# Patient Record
Sex: FEMALE | Race: WHITE
[De-identification: names, ages, dates, MRNs, and addresses within clinical notes are randomized per-mention and may not be internally consistent; named-entity substitution may affect disease eponyms.]

---

## 2019-04-07 ENCOUNTER — HOSPITAL ENCOUNTER (EMERGENCY)
Dept: HOSPITAL 96 - M.ERS | Age: 59
Discharge: ANOTHER HOSPITAL | End: 2019-04-07
Payer: COMMERCIAL

## 2019-04-07 VITALS — BODY MASS INDEX: 26.98 KG/M2 | WEIGHT: 158.01 LBS | HEIGHT: 64 IN

## 2019-04-07 VITALS — SYSTOLIC BLOOD PRESSURE: 88 MMHG | DIASTOLIC BLOOD PRESSURE: 51 MMHG

## 2019-04-07 VITALS — SYSTOLIC BLOOD PRESSURE: 101 MMHG | DIASTOLIC BLOOD PRESSURE: 68 MMHG

## 2019-04-07 DIAGNOSIS — J44.1: Primary | ICD-10-CM

## 2019-04-07 DIAGNOSIS — Z88.6: ICD-10-CM

## 2019-04-07 DIAGNOSIS — F32.9: ICD-10-CM

## 2019-04-07 DIAGNOSIS — Z88.8: ICD-10-CM

## 2019-04-07 DIAGNOSIS — F17.210: ICD-10-CM

## 2019-04-07 DIAGNOSIS — F41.9: ICD-10-CM

## 2019-04-07 LAB
ABSOLUTE MONOCYTES: 0.7 THOU/UL (ref 0–1.2)
ALBUMIN SERPL-MCNC: 2.9 G/DL (ref 3.4–5)
ALP SERPL-CCNC: 107 U/L (ref 46–116)
ALT SERPL-CCNC: 91 U/L (ref 30–65)
ANION GAP SERPL CALC-SCNC: 8 MMOL/L (ref 7–16)
AST SERPL-CCNC: 53 U/L (ref 15–37)
BE: -0.5 MMOL/L
BILIRUB SERPL-MCNC: 1.1 MG/DL
BUN SERPL-MCNC: 33 MG/DL (ref 7–18)
CALCIUM SERPL-MCNC: 8.5 MG/DL (ref 8.5–10.1)
CHLORIDE SERPL-SCNC: 96 MMOL/L (ref 98–107)
CO2 SERPL-SCNC: 29 MMOL/L (ref 21–32)
CREAT SERPL-MCNC: 1.6 MG/DL (ref 0.6–1.3)
GLUCOSE SERPL-MCNC: 122 MG/DL (ref 70–99)
GRANULOCYTES NFR BLD MANUAL: 78 %
HCT VFR BLD CALC: 34.6 % (ref 37–47)
HGB BLD-MCNC: 11.8 GM/DL (ref 12–15)
INR PPP: 1.1
LYMPHOCYTES # BLD: 1.6 THOU/UL (ref 0.8–5.3)
LYMPHOCYTES NFR BLD AUTO: 12 %
MCH RBC QN AUTO: 31 PG (ref 26–34)
MCHC RBC AUTO-ENTMCNC: 34.2 G/DL (ref 28–37)
MCV RBC: 90.7 FL (ref 80–100)
MONOCYTES NFR BLD: 5 %
MPV: 9.7 FL. (ref 7.2–11.1)
NEUTROPHILS # BLD: 11.3 THOU/UL (ref 1.6–8.1)
NEUTS BAND NFR BLD: 5 %
NT-PRO BRAIN NAT PEPTIDE: 578 PG/ML (ref ?–300)
NUCLEATED RBCS: 0 /100WBC
PCO2 BLD: 47.6 MMHG (ref 35–45)
PLATELET # BLD EST: ADEQUATE 10*3/UL
PLATELET COUNT*: 155 THOU/UL (ref 150–400)
PO2 BLD: 151.2 MMHG (ref 75–100)
POTASSIUM SERPL-SCNC: 3.9 MMOL/L (ref 3.5–5.1)
PROT SERPL-MCNC: 7.2 G/DL (ref 6.4–8.2)
PROTHROMBIN TIME: 11.1 SECONDS (ref 9.2–11.5)
RBC # BLD AUTO: 3.81 MIL/UL (ref 4.2–5)
RBC MORPH BLD: NORMAL
RDW-CV: 11.6 % (ref 10.5–14.5)
SODIUM SERPL-SCNC: 133 MMOL/L (ref 136–145)
TROPONIN-I LEVEL: <0.06 NG/ML (ref ?–0.06)
WBC # BLD AUTO: 13.6 THOU/UL (ref 4–11)

## 2019-04-22 ENCOUNTER — HOSPITAL ENCOUNTER (INPATIENT)
Dept: HOSPITAL 96 - M.ERS | Age: 59
LOS: 3 days | Discharge: HOME HEALTH | DRG: 189 | End: 2019-04-25
Attending: HOSPITALIST | Admitting: HOSPITALIST
Payer: COMMERCIAL

## 2019-04-22 VITALS — SYSTOLIC BLOOD PRESSURE: 109 MMHG | DIASTOLIC BLOOD PRESSURE: 66 MMHG

## 2019-04-22 VITALS — DIASTOLIC BLOOD PRESSURE: 52 MMHG | SYSTOLIC BLOOD PRESSURE: 96 MMHG

## 2019-04-22 VITALS — SYSTOLIC BLOOD PRESSURE: 106 MMHG | DIASTOLIC BLOOD PRESSURE: 61 MMHG

## 2019-04-22 VITALS — SYSTOLIC BLOOD PRESSURE: 104 MMHG | DIASTOLIC BLOOD PRESSURE: 63 MMHG

## 2019-04-22 VITALS — BODY MASS INDEX: 25.43 KG/M2 | HEIGHT: 60.98 IN | WEIGHT: 134.7 LBS

## 2019-04-22 DIAGNOSIS — I25.10: ICD-10-CM

## 2019-04-22 DIAGNOSIS — I25.2: ICD-10-CM

## 2019-04-22 DIAGNOSIS — Z79.899: ICD-10-CM

## 2019-04-22 DIAGNOSIS — Z90.49: ICD-10-CM

## 2019-04-22 DIAGNOSIS — Z88.6: ICD-10-CM

## 2019-04-22 DIAGNOSIS — N18.3: ICD-10-CM

## 2019-04-22 DIAGNOSIS — M19.90: ICD-10-CM

## 2019-04-22 DIAGNOSIS — J96.22: Primary | ICD-10-CM

## 2019-04-22 DIAGNOSIS — R65.10: ICD-10-CM

## 2019-04-22 DIAGNOSIS — G93.41: ICD-10-CM

## 2019-04-22 DIAGNOSIS — F41.9: ICD-10-CM

## 2019-04-22 DIAGNOSIS — F17.210: ICD-10-CM

## 2019-04-22 DIAGNOSIS — J44.1: ICD-10-CM

## 2019-04-22 DIAGNOSIS — Z88.1: ICD-10-CM

## 2019-04-22 DIAGNOSIS — Z79.82: ICD-10-CM

## 2019-04-22 DIAGNOSIS — Z88.8: ICD-10-CM

## 2019-04-22 DIAGNOSIS — J45.909: ICD-10-CM

## 2019-04-22 DIAGNOSIS — J96.21: ICD-10-CM

## 2019-04-22 LAB
ABSOLUTE BASOPHILS: 0.1 THOU/UL (ref 0–0.2)
ABSOLUTE EOSINOPHILS: 0.1 THOU/UL (ref 0–0.7)
ABSOLUTE MONOCYTES: 0.8 THOU/UL (ref 0–1.2)
ALBUMIN SERPL-MCNC: 3.1 G/DL (ref 3.4–5)
ALP SERPL-CCNC: 115 U/L (ref 46–116)
ALT SERPL-CCNC: 33 U/L (ref 30–65)
ANION GAP SERPL CALC-SCNC: 5 MMOL/L (ref 7–16)
AST SERPL-CCNC: 18 U/L (ref 15–37)
BASOPHILS NFR BLD AUTO: 0.5 %
BE: 6.3 MMOL/L
BILIRUB SERPL-MCNC: 0.3 MG/DL
BUN SERPL-MCNC: 20 MG/DL (ref 7–18)
CALCIUM SERPL-MCNC: 8.9 MG/DL (ref 8.5–10.1)
CHLORIDE SERPL-SCNC: 100 MMOL/L (ref 98–107)
CO2 SERPL-SCNC: 37 MMOL/L (ref 21–32)
CREAT SERPL-MCNC: 0.9 MG/DL (ref 0.6–1.3)
EOSINOPHIL NFR BLD: 0.8 %
GLUCOSE SERPL-MCNC: 118 MG/DL (ref 70–99)
GRANULOCYTES NFR BLD MANUAL: 74.7 %
HCT VFR BLD CALC: 38 % (ref 37–47)
HGB BLD-MCNC: 12.4 GM/DL (ref 12–15)
LYMPHOCYTES # BLD: 2 THOU/UL (ref 0.8–5.3)
LYMPHOCYTES NFR BLD AUTO: 17.2 %
MCH RBC QN AUTO: 30.3 PG (ref 26–34)
MCHC RBC AUTO-ENTMCNC: 32.7 G/DL (ref 28–37)
MCV RBC: 92.8 FL (ref 80–100)
MONOCYTES NFR BLD: 6.8 %
MPV: 8.2 FL. (ref 7.2–11.1)
NEUTROPHILS # BLD: 8.6 THOU/UL (ref 1.6–8.1)
NUCLEATED RBCS: 0 /100WBC
PCO2 BLD: 61.7 MMHG (ref 35–45)
PLATELET COUNT*: 288 THOU/UL (ref 150–400)
PO2 BLD: 72.6 MMHG (ref 75–100)
POTASSIUM SERPL-SCNC: 3.5 MMOL/L (ref 3.5–5.1)
PROT SERPL-MCNC: 7.3 G/DL (ref 6.4–8.2)
RBC # BLD AUTO: 4.1 MIL/UL (ref 4.2–5)
RDW-CV: 12.2 % (ref 10.5–14.5)
SODIUM SERPL-SCNC: 142 MMOL/L (ref 136–145)
TROPONIN-I LEVEL: <0.06 NG/ML (ref ?–0.06)
WBC # BLD AUTO: 11.5 THOU/UL (ref 4–11)

## 2019-04-22 PROCEDURE — 5A09357 ASSISTANCE WITH RESPIRATORY VENTILATION, LESS THAN 24 CONSECUTIVE HOURS, CONTINUOUS POSITIVE AIRWAY PRESSURE: ICD-10-PCS | Performed by: HOSPITALIST

## 2019-04-22 NOTE — NUR
PT O2 SAT FLUCTUATING, NURSE WENT INTO ROOM. BIPAP MASK OFF PT, NO ONE AT
BEDSIDE. NURSE ASKED PT IF PT TOOK BIPAP OFF, PT NODDED HEAD UP AND DOWN. NURSE
INFORMED PT THAT BIPAP WAS HELPING PT AND PT NEEDED TO KEEP BIPAP ON. PT STATED
UNDERSTANDING AND STATED "BUT I HAD TO SPIT". PT ATTEMPTED TO REATTACH BIPAP
MASK AND DROPPED HEAD AND CLOSED EYES IN THE MIDDLE OF ATTEMPTING. NURSE STATED
PT NAME, PT HEAD CAME BACK UP AND PT SAID "OH YES, WHAT?". NURSE REATTACHED
BIPAP. ALIDA, NP MADE AWARE AND IN ROOM TALKING TO PT.

## 2019-04-22 NOTE — NUR
PT BROUGHT IN BY EMS WITH REPORTS OF SOA. PT TRANSFERRED FROM AMBULANCE COT TO
ER BED BY SELF. STAFF AT BEDSIDE READY TO ASSIST IF NEED BE. PT CHANGED INTO
GOWN. TELEMETRY, BP CUFF, AND PULSE OX APPLIED TO PT. CALL LIGHT WITHIN REACH.

## 2019-04-22 NOTE — NUR
VSS, ASSUMED CARE OF PT FROM ER, PT IS IN BED WITH CALL LIGHT IN REACH AND
FAMILY AT BEDSIDE, PT IS A&O4 ON 3L NC AND IS UP WITH STAND BY, WILL FOLLOW
WITH PLAN OF CARE.

## 2019-04-22 NOTE — NUR
INITL ASSESSMENT COMPLETED AT 2000. PT RANG LIGHT AT THAT TIME STATING SHE
COULD NOT BREATHE AND WAS IN A PANIC. PT ON O2 AT 3 LITERS, RESPIRATIONS
28-30 HEART RATE IN 80'S. RT GAVE PT DUONEB TREATMENT. PT GIVEN PRN LORAZEPAM.
PT PLACED ON HOME TRILOGY UNIT PER RT. PT'S O2 SAT WAS 94% AFTER
INTERVENTIONS. DR RADER NOTIFIED AT 2200 REGARDING EVENTS. ORDERS RECIEVED.

## 2019-04-22 NOTE — EKG
[place] [address]
Phone:  [phone]                     ELECTROCARDIOGRAM REPORT      
_______________________________________________________________________________
 
Name:       TAMMY BAGLEY                Room:           [address]    ADM [place]  
.R.#:  [id number]      Account #:      [id number]  
Admission:  19     Attend Phys:    Marcell Muhammad MD    
Discharge:               Date of Birth:  [birth year]  
         Report #: [id number]
    [id number]
_______________________________________________________________________________
THIS REPORT FOR:  //name//                      
 
                         [facility] ED
                                       
Test Date:    2019               Test Time:    14:04:23
Pat Name:     TAMMY BAGLEY              Department:   
Patient ID:   [id number]            Room:         [facility]
Gender:       F                        Technician:   
:          1960               Requested By: Anushka Castrejon
Order Number: [id number] MD:   Justin Tovar
                                 Measurements
Intervals                              Axis          
Rate:         87                       P:            78
VA:           141                      QRS:          4
QRSD:         92                       T:            44
QT:           385                                    
QTc:          463                                    
                           Interpretive Statements
Sinus rhythm
Probable left ventricular hypertrophy
Anterior ST elevation, probably due to LVH
No previous ECG available for comparison
 
Electronically Signed On 2019 16:42:29 CDT by Justin Tovar
[email or web address]
 
 
 
 
 
 
 
 
 
 
 
 
 
 
 
 
 
 
  <ELECTRONICALLY SIGNED>
                                           By: Justin Tovar MD, [facility]      
  19     1642
D: 19 1404   _____________________________________
T: 19 1404   Justin Tovar MD, FAC        /EPI

## 2019-04-22 NOTE — NUR
PT ASKED FOR ICE WATER. ALIDA, NP OKAYED PT TO HAVE ICE WATER. ICE WATER GIVEN TO
PT. PT STATED APPRECIATION. PT AWARE OF NEED FOR UA SPECIMEN, STATED
UNDERSTANDING.

## 2019-04-23 VITALS — DIASTOLIC BLOOD PRESSURE: 58 MMHG | SYSTOLIC BLOOD PRESSURE: 97 MMHG

## 2019-04-23 VITALS — SYSTOLIC BLOOD PRESSURE: 158 MMHG | DIASTOLIC BLOOD PRESSURE: 83 MMHG

## 2019-04-23 VITALS — SYSTOLIC BLOOD PRESSURE: 101 MMHG | DIASTOLIC BLOOD PRESSURE: 56 MMHG

## 2019-04-23 VITALS — SYSTOLIC BLOOD PRESSURE: 108 MMHG | DIASTOLIC BLOOD PRESSURE: 60 MMHG

## 2019-04-23 VITALS — DIASTOLIC BLOOD PRESSURE: 60 MMHG | SYSTOLIC BLOOD PRESSURE: 115 MMHG

## 2019-04-23 VITALS — SYSTOLIC BLOOD PRESSURE: 115 MMHG | DIASTOLIC BLOOD PRESSURE: 68 MMHG

## 2019-04-23 LAB
ANION GAP SERPL CALC-SCNC: 2 MMOL/L (ref 7–16)
BUN SERPL-MCNC: 18 MG/DL (ref 7–18)
CALCIUM SERPL-MCNC: 8.5 MG/DL (ref 8.5–10.1)
CHLORIDE SERPL-SCNC: 102 MMOL/L (ref 98–107)
CHOLEST SERPL-MCNC: 133 MG/DL (ref ?–200)
CO2 SERPL-SCNC: 35 MMOL/L (ref 21–32)
CREAT SERPL-MCNC: 1 MG/DL (ref 0.6–1.3)
GLUCOSE SERPL-MCNC: 118 MG/DL (ref 70–99)
GRANULOCYTES NFR BLD MANUAL: 95 %
HCT VFR BLD CALC: 34.6 % (ref 37–47)
HDLC SERPL-MCNC: 69 MG/DL (ref 40–?)
HGB BLD-MCNC: 11.5 GM/DL (ref 12–15)
LDLC SERPL-MCNC: 58 MG/DL (ref ?–100)
LYMPHOCYTES # BLD: 0.5 THOU/UL (ref 0.8–5.3)
LYMPHOCYTES NFR BLD AUTO: 5 %
MAGNESIUM SERPL-MCNC: 1.6 MG/DL (ref 1.8–2.4)
MCH RBC QN AUTO: 30.5 PG (ref 26–34)
MCHC RBC AUTO-ENTMCNC: 33.2 G/DL (ref 28–37)
MCV RBC: 92.1 FL (ref 80–100)
MPV: 8.5 FL. (ref 7.2–11.1)
NEUTROPHILS # BLD: 9.2 THOU/UL (ref 1.6–8.1)
NUCLEATED RBCS: 0 /100WBC
PLATELET # BLD EST: ADEQUATE 10*3/UL
PLATELET COUNT*: 298 THOU/UL (ref 150–400)
POTASSIUM SERPL-SCNC: 4.5 MMOL/L (ref 3.5–5.1)
RBC # BLD AUTO: 3.75 MIL/UL (ref 4.2–5)
RDW-CV: 12.2 % (ref 10.5–14.5)
SODIUM SERPL-SCNC: 139 MMOL/L (ref 136–145)
TC:HDL: 1.9 RATIO
TRIGL SERPL-MCNC: 31 MG/DL (ref ?–150)
VLDLC SERPL CALC-MCNC: 6 MG/DL (ref ?–40)
WBC # BLD AUTO: 9.7 THOU/UL (ref 4–11)
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
## 2019-04-24 VITALS — DIASTOLIC BLOOD PRESSURE: 71 MMHG | SYSTOLIC BLOOD PRESSURE: 145 MMHG

## 2019-04-24 VITALS — DIASTOLIC BLOOD PRESSURE: 64 MMHG | SYSTOLIC BLOOD PRESSURE: 119 MMHG

## 2019-04-24 VITALS — DIASTOLIC BLOOD PRESSURE: 54 MMHG | SYSTOLIC BLOOD PRESSURE: 111 MMHG

## 2019-04-24 VITALS — DIASTOLIC BLOOD PRESSURE: 71 MMHG | SYSTOLIC BLOOD PRESSURE: 117 MMHG

## 2019-04-24 VITALS — SYSTOLIC BLOOD PRESSURE: 113 MMHG | DIASTOLIC BLOOD PRESSURE: 77 MMHG

## 2019-04-24 VITALS — SYSTOLIC BLOOD PRESSURE: 145 MMHG | DIASTOLIC BLOOD PRESSURE: 71 MMHG

## 2019-04-24 VITALS — DIASTOLIC BLOOD PRESSURE: 72 MMHG | SYSTOLIC BLOOD PRESSURE: 125 MMHG

## 2019-04-24 LAB
ANION GAP SERPL CALC-SCNC: 2 MMOL/L (ref 7–16)
BUN SERPL-MCNC: 20 MG/DL (ref 7–18)
CALCIUM SERPL-MCNC: 8.2 MG/DL (ref 8.5–10.1)
CHLORIDE SERPL-SCNC: 105 MMOL/L (ref 98–107)
CO2 SERPL-SCNC: 32 MMOL/L (ref 21–32)
CREAT SERPL-MCNC: 0.8 MG/DL (ref 0.6–1.3)
EST. AVERAGE GLUCOSE BLD GHB EST-MCNC: 111 MG/DL
GLUCOSE SERPL-MCNC: 132 MG/DL (ref 70–99)
GLYCOHEMOGLOBIN (HGB A1C): 5.5 % (ref 4.8–5.6)
POTASSIUM SERPL-SCNC: 4.2 MMOL/L (ref 3.5–5.1)
SODIUM SERPL-SCNC: 139 MMOL/L (ref 136–145)

## 2019-04-24 NOTE — NUR
CALL PLACED TO DR KRUSE'S OFFICE IN [place] TO TRY TO MAKE F/U APPT, NOT
ABLE TO MAKE SOONER APT AND HE IS TAKING NEW PTS IN JANUARY.  CALL TO [facility]
OFFICE AT THE [facility] WHERE PT HAS APPT IN [place]UN[place], WAS ABLE TO MAKE SOONER APPT
FOR MAY 8 WITH DR NELSON.  INFO IN PT'S DC INSTRUCTIONS AND WILL DISCUSS WITH
HER ALSO.  THEY REQUEST DC AND CLINICAL INFO FAXED [phone]

## 2019-04-24 NOTE — NUR
INITIAL ASSESSMENT COMPLETED AS CHARTED. VSS. UPON INITAL ASSESSMENT PT HAD
TAKEN OXYGEN OFF FOR AN UNKNOWN AMOUNT OF TIME. PT STATED SHE "FELT FUNNY". PT
EDUCATED ON NEED FOR O2, PT PUT ON TRILOGY AT THAT TIME. LATER PT C/O ANXIETY
FROM WEARING MASK. PRN ATIVAN GIVEN WITH GOOD RESULTS. TRACING SR ON MONITOR.
HOURLY ROUNDING AND FALL PRECAUTIONS IN PLACE.CLWR

## 2019-04-24 NOTE — CON
[address]  [address]                    CONSULTATION                  
_______________________________________________________________________________
 
Name:       TAMMY BAGLEY                Room:           [address] [place]  
M.R.#:  [id number]      Account #:      [id number]  
Admission:  04/22/19     Attend Phys:    Marcell Muhammad MD    
Discharge:               Date of Birth:  [birth year]  
         Report #: [id number]
                                                                     [id number]  
_______________________________________________________________________________
THIS REPORT FOR:  //name//                      
 
CC: Cornelius Muhammad
 
NEW PATIENT EVALUATION
 
REASON FOR EVALUATION:  She is known to have COPD.
 
CHIEF COMPLAINT:  Shortness of breath, cough for 3 weeks.
 
HISTORY OF PRESENT ILLNESS:  She is known to have COPD.  She says she also has
asthma when she was a child.  She has worsening shortness of breath, worsening
cough with a dark gray mucus.  Over the last 3 weeks, she has been desatting at
home to 60%; however, EMS reported 88%.  She received treatment in the Emergency
Room.  She has been using her Trilogy more often most of the day.
 
Her most recent exacerbation was around a month ago.  She uses Trilogy as above.
 She is using DuoNeb every 4 hours.  She is on home O2 at 3 liters.  Has been
using steroids more frequently.  She still continues to smoke a few cigarettes a
day.  Currently receiving IV steroids, DuoNebs.
 
PAST MEDICAL HISTORY:  Chronic hypercapnic and hypoxemic respiratory failure,
recurrent COPD exacerbation.
 
PAST SURGICAL HISTORY:  Cholecystectomy, tubal ligation.
 
HOME MEDICATIONS:  Reviewed.
 
MEDICATIONS AT HOME:  Include Advair, Spiriva HandiHaler, azithromycin, which
she is taking daily.
 
FAMILY HISTORY:  Noncontributory.
 
SOCIAL HISTORY:  Continues to smoke unfortunately, had 36 pack-year smoking.
 
REVIEW OF SYSTEMS:  Twelve-point review of systems is otherwise as above
reviewed.
 
PHYSICAL EXAMINATION:
GENERAL:  The patient is anxious, not in distress.
VITAL SIGNS:  She is afebrile, pulse is 87, respiratory rate 16, blood pressure
115/60.  Without Trilogy, she states she is able to speak full sentences without
difficulty.
HEAD AND NECK:  Neck is supple.  Oral mucosa is clear.
CHEST:  She has decreased breath sounds.
 
 
 
[place] [address]                    CONSULTATION                  
_______________________________________________________________________________
 
Name:       TAMMY BAGLEY                Room:           [address] [place]  
[facility].#:  [id number]      Account #:      [id number]  
Admission:  04/22/19     Attend Phys:    Marcell Muhammad MD    
Discharge:               Date of Birth:  [birth year]  
         Report #: [id number]
                                                                     [id number]  
_______________________________________________________________________________
CARDIOVASCULAR:  Regular rhythm.
ABDOMEN:  Soft, nontender.
EXTREMITIES:  No edema.
PSYCHIATRIC:  Alert, oriented, flat affect.
 
LABORATORY DATABASE:  Arterial blood gas, which I have reviewed, pH 7.35, pCO2
61.7, pO2 72 and this is on nasal cannula 3 liters.  Chest x-ray showed no acute
infiltrate.  Hyperinflation.  Creatinine was 1.  White blood cell count 9.7.
 
ASSESSMENT AND PLAN:  Advanced chronic obstructive pulmonary disease, currently
with exacerbation.  The patient also has deconditioning.  Will benefit from
rehabilitation.  At this time, we will agree with treatment with bronchodilator
treatment, Solu-Medrol every 8 hours at 450 mg and DuoNeb every 4 hours for
chronic obstructive pulmonary disease exacerbation. Currently, with her
recurrent chronic obstructive pulmonary disease exacerbation, she is on chronic
azithromycin.  Would evaluate for pulmonary rehabilitation to be started after
discharge within a month.
 
Chronic obstructive pulmonary disease exacerbation.  Agree with steroid
treatment, bronchodilator treatment.  Consider deescalating antibiotics to
Levaquin.
 
We will follow up in the morning.  Today, we will discuss with nursing staff. 
She is using Trilogy most of the time and to switch her to use every 4 hours on,
4 hours off.
 
Thank you for the consultation.
 
 
 
 
 
 
 
 
 
 
 
 
 
 
 
 
 
<ELECTRONICALLY SIGNED>
                                        By:  Tamara Veliz MD        
04/24/19     1309
D: 04/23/19 1321_______________________________________
T: 04/24/19 0100Tamara Veliz MD           /nt

## 2019-04-25 VITALS — DIASTOLIC BLOOD PRESSURE: 87 MMHG | SYSTOLIC BLOOD PRESSURE: 144 MMHG

## 2019-04-25 VITALS — DIASTOLIC BLOOD PRESSURE: 60 MMHG | SYSTOLIC BLOOD PRESSURE: 151 MMHG

## 2019-04-25 VITALS — DIASTOLIC BLOOD PRESSURE: 67 MMHG | SYSTOLIC BLOOD PRESSURE: 123 MMHG

## 2019-04-25 VITALS — DIASTOLIC BLOOD PRESSURE: 74 MMHG | SYSTOLIC BLOOD PRESSURE: 120 MMHG

## 2019-04-25 NOTE — NUR
RECEIVED REPORT AND ASSUMED CARE OF PT AT 0730.PT IS A/OX4.SR ON MONITOR.ON 3
L NC.IV PATENT AND SALINE LOCKED.NO COMPLAINTS OF PAIN.NO SKIN ISSUES.CLEAR
DIMINISHED LUNGS SOUNDS.NO COMPLAINTS OF PAIN.CALL LIGHT AND FALL PRECAUTIONS
IN PLACE. WILL CONTINUE TO MONITOR.

## 2019-04-25 NOTE — NUR
ORDERS NOTED FOR DC HOME WITH HH, HAD RECEIVED OK FROM LESLIE TO FOLLOW, DR LUTHER TO SIGN HH ORDERS UNTIL PT GOES TO HER PCP APPT WITH DR NELSON ON 5/9.
CALLED AND FAXED ORDERS TO [facility]. PT ANXIOUS TO GO HOME.  DENIES
NEEDS

## 2019-04-25 NOTE — NUR
PT OK TO DISCHARGE.ALL DISCHARGE PAPER COMPLETED.EDUCATION PROVIDED ON FOLLOW
UP AND MEDICATIONS.IV AND HEART MONITOR TAKEN OUT.ALL PERSONAL BELONGINGS
HANDED OVER TO PT.PT IS GOING HOME WITH HER PORTABLE O2 AT 3 L NC.PT WHEELED
TO HER CAR BY [employer].

## 2019-04-25 NOTE — NUR
INITAL ASSESSMENT COMPLETED AS CHARTED. VSS. TRACING SR ON MONITOR. PT REPORTS
FEELING ANXIOUS. PRN ATIVAN GIVEN PER EMAR WITH GOOD RESULTS. PT RESTING
COMFORTABLY, DENIES ANY FURTHER NEEDS AT THIS TIME. HOURLY ROUNDING AND FALL
PRECAUTIONS IN PLACE. CLWR.